# Patient Record
Sex: MALE | HISPANIC OR LATINO
[De-identification: names, ages, dates, MRNs, and addresses within clinical notes are randomized per-mention and may not be internally consistent; named-entity substitution may affect disease eponyms.]

---

## 2021-01-15 PROBLEM — Z00.00 ENCOUNTER FOR PREVENTIVE HEALTH EXAMINATION: Status: ACTIVE | Noted: 2021-01-15

## 2021-01-19 ENCOUNTER — APPOINTMENT (OUTPATIENT)
Dept: ORTHOPEDIC SURGERY | Facility: CLINIC | Age: 46
End: 2021-01-19
Payer: MEDICAID

## 2021-01-19 DIAGNOSIS — M25.522 PAIN IN LEFT ELBOW: ICD-10-CM

## 2021-01-19 DIAGNOSIS — M77.12 LATERAL EPICONDYLITIS, LEFT ELBOW: ICD-10-CM

## 2021-01-19 PROCEDURE — 73080 X-RAY EXAM OF ELBOW: CPT | Mod: LT

## 2021-01-19 PROCEDURE — 99204 OFFICE O/P NEW MOD 45 MIN: CPT

## 2021-01-19 PROCEDURE — 99072 ADDL SUPL MATRL&STAF TM PHE: CPT

## 2021-01-19 RX ORDER — MELOXICAM 15 MG/1
15 TABLET ORAL
Qty: 30 | Refills: 2 | Status: ACTIVE | COMMUNITY
Start: 2021-01-19 | End: 1900-01-01

## 2021-01-19 NOTE — PHYSICAL EXAM
[FreeTextEntry1] : General: well nourished, in no acute distress, alert and oriented to person, place and time.\par Psychiatric: normal mood and affect, no abnormal movements or speech patterns.\par Eyes: vision intact without deficits, sclera and conjunctiva were normal, pupils were equal in size. \par ENT: Ears and nose were normal in appearance. No thyromegaly.\par Lymph: no enlarged nodes, no lymphedema in extremity.\par Respiratory: Normal respiratory rhythm and effort. No wheezing detected without auscultation. No shortness of breath or respiratory distress.\par Cardiac: no cardiac related leg swelling, 2+ peripheral pulses.\par Neurology: normal gross sensation in extremities to light touch.\par Abdomen: soft, non-tender, tympanic, no masses.\par \par LUE:\par \par Skin CDI. No ecchymosis. +~1-2cm soft tissue mass overlying the extensor wad.  This mass is superficial, soft, mobile, and nontender.  The patient does have tenderness to palpation over the lateral epicondyle and extensor wad but not on the mass itself.\par There is an additional soft tissue mass, soft, superficial, mobile, and nontender over the anterior shoulder area.\par M/U/R/AIN/PIN 5/5. M/U/R/Ax SILT. +2 Rad pulse. Compartments soft. \par +pain with resisted wrist extension.\par Full painless supination/pronation w/o blocks.\par Shoulder ROM: Active abduction to 150 degrees without pain. \par

## 2021-01-19 NOTE — DATA REVIEWED
[de-identified] : 1/19/2021–left elbow x-rays (AP, lateral, oblique): There are no fractures, dislocations, or osseous lesions.

## 2021-01-19 NOTE — HISTORY OF PRESENT ILLNESS
[FreeTextEntry1] : This is a RHD 45M with no past medical history, former smoker (quit 2017) who is presenting for evaluation of  atraumatic left elbow pain which began 9/2020. Was seen at  on 11/2020 where he was given ibuprofen 600mg. He has tried this daily for 5 days without relief. Currently the pain is 4/10 at rest and 10/10 with active shoulder abduction. Pain is localized to the lateral aspect of his L elbow. Denies numbness/tingling. In addition, he also notes unrelated lumps in this area- left lateral elbow for at least 8 yrs and left anterior shoulder area for at least 10 yrs. These lumps have not changed in size and are not painful. Denies constitutional symptoms.

## 2021-01-19 NOTE — DISCUSSION/SUMMARY
[de-identified] : This is a 45-year-old male with a left lateral epicondylitis.  I have explained his condition and his treatment options in detail.  I recommended meloxicam to be taken for the next 10 days and as needed thereafter.  I have discussed all side effects.  I have also recommended physical therapy to work on range of motion and stretching and a tennis elbow brace to be worn as needed.  Regarding his soft tissue masses over the left elbow and left shoulder area, these have been unchanged for at least 8 years and have not bothered him, therefore they are consistent with benign lipomas.  If he notices any change in these masses he was told to return for follow-up.  He may otherwise follow-up on a as needed basis.